# Patient Record
Sex: FEMALE | Race: BLACK OR AFRICAN AMERICAN | Employment: UNEMPLOYED | URBAN - NONMETROPOLITAN AREA
[De-identification: names, ages, dates, MRNs, and addresses within clinical notes are randomized per-mention and may not be internally consistent; named-entity substitution may affect disease eponyms.]

---

## 2017-03-21 ENCOUNTER — OFFICE VISIT (OUTPATIENT)
Dept: FAMILY MEDICINE CLINIC | Age: 32
End: 2017-03-21

## 2017-03-21 VITALS
RESPIRATION RATE: 12 BRPM | DIASTOLIC BLOOD PRESSURE: 62 MMHG | WEIGHT: 97.6 LBS | TEMPERATURE: 98.6 F | BODY MASS INDEX: 17.96 KG/M2 | HEART RATE: 72 BPM | HEIGHT: 62 IN | SYSTOLIC BLOOD PRESSURE: 96 MMHG

## 2017-03-21 DIAGNOSIS — Z00.00 PHYSICAL EXAM: Primary | ICD-10-CM

## 2017-03-21 DIAGNOSIS — H61.21 HEARING LOSS DUE TO CERUMEN IMPACTION, RIGHT: ICD-10-CM

## 2017-03-21 DIAGNOSIS — Z86.2 HISTORY OF ANEMIA: ICD-10-CM

## 2017-03-21 PROCEDURE — 99395 PREV VISIT EST AGE 18-39: CPT | Performed by: NURSE PRACTITIONER

## 2017-03-21 PROCEDURE — 99999 PR REMOVAL IMPACTED CERUMEN IRRIGATION/LVG UNILAT: CPT | Performed by: NURSE PRACTITIONER

## 2017-03-21 ASSESSMENT — PATIENT HEALTH QUESTIONNAIRE - PHQ9
SUM OF ALL RESPONSES TO PHQ9 QUESTIONS 1 & 2: 0
1. LITTLE INTEREST OR PLEASURE IN DOING THINGS: 0
SUM OF ALL RESPONSES TO PHQ QUESTIONS 1-9: 0
2. FEELING DOWN, DEPRESSED OR HOPELESS: 0

## 2017-09-20 ENCOUNTER — HOSPITAL ENCOUNTER (OUTPATIENT)
Dept: GENERAL RADIOLOGY | Age: 32
Discharge: HOME | End: 2017-09-20
Payer: MEDICARE

## 2017-09-20 ENCOUNTER — HOSPITAL ENCOUNTER (OUTPATIENT)
Age: 32
Discharge: HOME | End: 2017-09-20
Payer: MEDICARE

## 2017-09-20 ENCOUNTER — TELEPHONE (OUTPATIENT)
Dept: FAMILY MEDICINE CLINIC | Age: 32
End: 2017-09-20

## 2017-09-20 ENCOUNTER — OFFICE VISIT (OUTPATIENT)
Dept: FAMILY MEDICINE CLINIC | Age: 32
End: 2017-09-20
Payer: MEDICARE

## 2017-09-20 VITALS
WEIGHT: 105 LBS | BODY MASS INDEX: 19.32 KG/M2 | HEIGHT: 62 IN | HEART RATE: 92 BPM | SYSTOLIC BLOOD PRESSURE: 116 MMHG | DIASTOLIC BLOOD PRESSURE: 64 MMHG | RESPIRATION RATE: 10 BRPM | TEMPERATURE: 98.1 F

## 2017-09-20 DIAGNOSIS — R06.2 WHEEZING: ICD-10-CM

## 2017-09-20 DIAGNOSIS — S39.012A LUMBAR STRAIN, INITIAL ENCOUNTER: Primary | ICD-10-CM

## 2017-09-20 DIAGNOSIS — M41.9 SCOLIOSIS OF THORACOLUMBAR SPINE, UNSPECIFIED SCOLIOSIS TYPE: ICD-10-CM

## 2017-09-20 DIAGNOSIS — R05.9 COUGH: ICD-10-CM

## 2017-09-20 PROCEDURE — G8427 DOCREV CUR MEDS BY ELIG CLIN: HCPCS | Performed by: NURSE PRACTITIONER

## 2017-09-20 PROCEDURE — G8420 CALC BMI NORM PARAMETERS: HCPCS | Performed by: NURSE PRACTITIONER

## 2017-09-20 PROCEDURE — 99213 OFFICE O/P EST LOW 20 MIN: CPT | Performed by: NURSE PRACTITIONER

## 2017-09-20 PROCEDURE — 71020 XR CHEST STANDARD TWO VW: CPT

## 2017-09-20 PROCEDURE — 1036F TOBACCO NON-USER: CPT | Performed by: NURSE PRACTITIONER

## 2017-09-20 RX ORDER — IBUPROFEN 600 MG/1
600 TABLET ORAL EVERY 6 HOURS PRN
Qty: 60 TABLET | Refills: 1 | Status: SHIPPED | OUTPATIENT
Start: 2017-09-20

## 2017-09-20 ASSESSMENT — ENCOUNTER SYMPTOMS
BACK PAIN: 1
CHEST TIGHTNESS: 0
COUGH: 1
ABDOMINAL PAIN: 0
SHORTNESS OF BREATH: 0
CHOKING: 0
DIARRHEA: 0
CONSTIPATION: 0
ABDOMINAL DISTENTION: 0
WHEEZING: 1

## 2017-10-18 ENCOUNTER — OFFICE VISIT (OUTPATIENT)
Dept: FAMILY MEDICINE CLINIC | Age: 32
End: 2017-10-18
Payer: MEDICARE

## 2017-10-18 VITALS
WEIGHT: 107 LBS | BODY MASS INDEX: 19.69 KG/M2 | TEMPERATURE: 98.6 F | SYSTOLIC BLOOD PRESSURE: 116 MMHG | DIASTOLIC BLOOD PRESSURE: 76 MMHG | HEART RATE: 94 BPM | RESPIRATION RATE: 12 BRPM | HEIGHT: 62 IN

## 2017-10-18 DIAGNOSIS — S39.012D STRAIN OF LUMBAR REGION, SUBSEQUENT ENCOUNTER: Primary | ICD-10-CM

## 2017-10-18 DIAGNOSIS — Z23 NEEDS FLU SHOT: ICD-10-CM

## 2017-10-18 PROCEDURE — 90688 IIV4 VACCINE SPLT 0.5 ML IM: CPT | Performed by: NURSE PRACTITIONER

## 2017-10-18 PROCEDURE — G0008 ADMIN INFLUENZA VIRUS VAC: HCPCS | Performed by: NURSE PRACTITIONER

## 2017-10-18 PROCEDURE — G8427 DOCREV CUR MEDS BY ELIG CLIN: HCPCS | Performed by: NURSE PRACTITIONER

## 2017-10-18 PROCEDURE — G8420 CALC BMI NORM PARAMETERS: HCPCS | Performed by: NURSE PRACTITIONER

## 2017-10-18 PROCEDURE — 1036F TOBACCO NON-USER: CPT | Performed by: NURSE PRACTITIONER

## 2017-10-18 PROCEDURE — G8484 FLU IMMUNIZE NO ADMIN: HCPCS | Performed by: NURSE PRACTITIONER

## 2017-10-18 PROCEDURE — 99213 OFFICE O/P EST LOW 20 MIN: CPT | Performed by: NURSE PRACTITIONER

## 2017-10-18 ASSESSMENT — ENCOUNTER SYMPTOMS
RESPIRATORY NEGATIVE: 1
BACK PAIN: 0

## 2017-10-18 NOTE — PROGRESS NOTES
[address]  [address] [address]  Dept: [phone]  Dept Fax: [phone]  Loc: [phone]    Rosalina Vilchis is a 27 y.o. female who presents today for her medical conditions/complaints as noted below. Rosalina Vilchis is c/o of Follow-up (feeling better) and Other (wants flu shot )      HPI:     Pt here to follow up on lumbar pain. She states since she has bene taking the ibuprofen her back pain has gotten much better. She has a mild ache that comes and goes. She has returned to her normal activity. She also notes she used the albuterol inhaler prn and her wheezing has gone. She has been diagnosed with asthma and sees an asthma doctor every 4 months. She denies sob with activity or wheezing. Current Outpatient Prescriptions   Medication Sig Dispense Refill    ibuprofen (ADVIL;MOTRIN) 600 MG tablet Take 1 tablet by mouth every 6 hours as needed for Pain 60 tablet 1    Multiple Vitamins-Minerals (THERAPEUTIC MULTIVITAMIN-MINERALS) tablet Take 1 tablet by mouth daily.  albuterol (PROVENTIL HFA) 108 (90 BASE) MCG/ACT inhaler Inhale 2 puffs into the lungs every 6 hours as needed for Wheezing. 1 Inhaler 0     No current facility-administered medications for this visit. Past Medical History:   Diagnosis Date    Asthma     B12 deficiency anemia     Hemiplegia (HCC)     LEFT SIDED SINCE BIRTH    Iron deficiency anemia       Past Surgical History:   Procedure Laterality Date    ARM SURGERY      DILATION AND CURETTAGE OF UTERUS  3/13/2015    Hysteroscopy    HYSTERECTOMY  5/8/15    Dr. Rafy Sanchez       History reviewed. No pertinent family history.   Social History   Substance Use Topics    Smoking status: Never Smoker    Smokeless tobacco: Never Used    Alcohol use No        No Known Allergies    Health Maintenance   Topic Date Due    HIV screen  06/27/1998    Flu vaccine (1) 09/01/2017    Cervical cancer screen  06/16/2018    DTaP/Tdap/Td vaccine (2 - Td) 09/16/2025       Subjective:      Review of Systems   Constitutional: Negative. Respiratory: Negative. Cardiovascular: Negative. Musculoskeletal: Positive for myalgias (lumbar). Negative for arthralgias and back pain. Skin: Negative. Psychiatric/Behavioral: Negative for self-injury, sleep disturbance and suicidal ideas. Objective:     /76   Pulse 94   Temp 98.6 °F (37 °C) (Oral)   Resp 12   Ht 5' 2.21\" (1.58 m)   Wt 107 lb (48.5 kg)   LMP 05/08/2015   BMI 19.44 kg/m²     Physical Exam   Constitutional: She is oriented to person, place, and time. She appears well-developed and well-nourished. No distress. Cardiovascular: Normal rate, regular rhythm, normal heart sounds and intact distal pulses. No murmur heard. Pulmonary/Chest: Effort normal and breath sounds normal. No respiratory distress. She has no wheezes. Musculoskeletal:   No lumbar tenderness with palpation, negative straight leg raise. NO gait alteration noted   Neurological: She is alert and oriented to person, place, and time. Nursing note and vitals reviewed. Assessment/Plan:          1. Needs flu shot    - INFLUENZA, QUADV, 3 YRS AND OLDER, IM, MDV, 0.5ML (FLUZONE QUADV)    2. Strain of lumbar region, subsequent encounter    Alternate moist heat and ice. Avoid lifting over 15 pounds. Return if condition returns. Also advised pt to wean down to one a day and if no pain may stop. Return in about 6 months (around 4/18/2018) for check up. Reccommended tobacco cessation options including pharmacologic methods, counseled great than 3 minutes during this visit:  Yes  []  No  []       Patient given educational materials - see patient instructions. Discussed use, benefit, and side effects of prescribed medications. All patient questions answered. Pt voiced understanding. Reviewed health maintenance.   Instructed to continue current medications, diet and exercise. Patient agreed with treatment plan. Follow up as directed.        Electronically signed by Ginger Mendez CNP on 10/18/2017 at 7:56 AM

## 2018-04-18 ENCOUNTER — HOSPITAL ENCOUNTER (OUTPATIENT)
Age: 33
Discharge: HOME | End: 2018-04-18
Payer: MEDICARE

## 2018-04-18 ENCOUNTER — OFFICE VISIT (OUTPATIENT)
Dept: FAMILY MEDICINE CLINIC | Age: 33
End: 2018-04-18
Payer: MEDICAID

## 2018-04-18 VITALS
DIASTOLIC BLOOD PRESSURE: 74 MMHG | TEMPERATURE: 98.9 F | BODY MASS INDEX: 19.07 KG/M2 | HEIGHT: 63 IN | RESPIRATION RATE: 16 BRPM | WEIGHT: 107.6 LBS | HEART RATE: 91 BPM | SYSTOLIC BLOOD PRESSURE: 116 MMHG

## 2018-04-18 DIAGNOSIS — Z86.2 HISTORY OF THROMBOCYTOSIS: ICD-10-CM

## 2018-04-18 DIAGNOSIS — M41.9 SCOLIOSIS OF THORACOLUMBAR SPINE, UNSPECIFIED SCOLIOSIS TYPE: ICD-10-CM

## 2018-04-18 DIAGNOSIS — I69.898 HISTORY OF LATERALIZING MOTOR DEFICIT FOLLOWING CEREBROVASCULAR ACCIDENT (CVA): ICD-10-CM

## 2018-04-18 DIAGNOSIS — Z00.00 PHYSICAL EXAM: Primary | ICD-10-CM

## 2018-04-18 LAB
BASOPHILS # BLD: 0.8 %
BASOPHILS ABSOLUTE: 0.1 THOU/MM3 (ref 0–0.1)
EOSINOPHIL # BLD: 2.1 %
EOSINOPHILS ABSOLUTE: 0.1 THOU/MM3 (ref 0–0.4)
HCT VFR BLD CALC: 45.7 % (ref 37–47)
HEMOGLOBIN: 15.4 GM/DL (ref 12–16)
LYMPHOCYTES # BLD: 49.7 %
LYMPHOCYTES ABSOLUTE: 3.2 THOU/MM3 (ref 1–4.8)
MCH RBC QN AUTO: 31 PG (ref 27–31)
MCHC RBC AUTO-ENTMCNC: 33.6 GM/DL (ref 33–37)
MCV RBC AUTO: 92.1 FL (ref 81–99)
MONOCYTES # BLD: 5.7 %
MONOCYTES ABSOLUTE: 0.4 THOU/MM3 (ref 0.4–1.3)
NUCLEATED RED BLOOD CELLS: 0 /100 WBC
PDW BLD-RTO: 14.3 % (ref 11.5–14.5)
PLATELET # BLD: 412 THOU/MM3 (ref 130–400)
PMV BLD AUTO: 8.3 FL (ref 7.4–10.4)
RBC # BLD: 4.96 MILL/MM3 (ref 4.2–5.4)
SEG NEUTROPHILS: 41.7 %
SEGMENTED NEUTROPHILS ABSOLUTE COUNT: 2.7 THOU/MM3 (ref 1.8–7.7)
WBC # BLD: 6.4 THOU/MM3 (ref 4.8–10.8)

## 2018-04-18 PROCEDURE — 36415 COLL VENOUS BLD VENIPUNCTURE: CPT

## 2018-04-18 PROCEDURE — 99395 PREV VISIT EST AGE 18-39: CPT | Performed by: NURSE PRACTITIONER

## 2018-04-18 PROCEDURE — 85025 COMPLETE CBC W/AUTO DIFF WBC: CPT

## 2018-04-18 ASSESSMENT — ENCOUNTER SYMPTOMS
RHINORRHEA: 0
GASTROINTESTINAL NEGATIVE: 1
RESPIRATORY NEGATIVE: 1
SINUS PAIN: 0

## 2018-04-18 ASSESSMENT — PATIENT HEALTH QUESTIONNAIRE - PHQ9
SUM OF ALL RESPONSES TO PHQ9 QUESTIONS 1 & 2: 0
2. FEELING DOWN, DEPRESSED OR HOPELESS: 0
1. LITTLE INTEREST OR PLEASURE IN DOING THINGS: 0
SUM OF ALL RESPONSES TO PHQ QUESTIONS 1-9: 0

## 2018-04-19 ENCOUNTER — TELEPHONE (OUTPATIENT)
Dept: FAMILY MEDICINE CLINIC | Age: 33
End: 2018-04-19

## 2024-09-04 ENCOUNTER — HOSPITAL ENCOUNTER (OUTPATIENT)
Dept: WOMENS IMAGING | Age: 39
Discharge: HOME | End: 2024-09-04
Payer: MEDICAID

## 2024-09-04 VITALS — HEIGHT: 62 IN | WEIGHT: 98 LBS | BODY MASS INDEX: 18.03 KG/M2

## 2024-09-04 DIAGNOSIS — Z12.31 VISIT FOR SCREENING MAMMOGRAM: ICD-10-CM

## 2024-09-04 PROCEDURE — 77063 BREAST TOMOSYNTHESIS BI: CPT
